# Patient Record
Sex: FEMALE | Race: WHITE
[De-identification: names, ages, dates, MRNs, and addresses within clinical notes are randomized per-mention and may not be internally consistent; named-entity substitution may affect disease eponyms.]

---

## 2017-07-24 ENCOUNTER — HOSPITAL ENCOUNTER (EMERGENCY)
Dept: HOSPITAL 10 - E/R | Age: 62
Discharge: HOME | End: 2017-07-24
Payer: MEDICARE

## 2017-07-24 VITALS
HEIGHT: 68 IN | HEIGHT: 68 IN | WEIGHT: 182.98 LBS | BODY MASS INDEX: 27.73 KG/M2 | WEIGHT: 182.98 LBS | BODY MASS INDEX: 27.73 KG/M2

## 2017-07-24 VITALS
DIASTOLIC BLOOD PRESSURE: 65 MMHG | HEART RATE: 77 BPM | SYSTOLIC BLOOD PRESSURE: 134 MMHG | TEMPERATURE: 97.9 F | RESPIRATION RATE: 18 BRPM

## 2017-07-24 DIAGNOSIS — R40.2362: ICD-10-CM

## 2017-07-24 DIAGNOSIS — R40.2142: ICD-10-CM

## 2017-07-24 DIAGNOSIS — K76.0: Primary | ICD-10-CM

## 2017-07-24 DIAGNOSIS — R40.2252: ICD-10-CM

## 2017-07-24 LAB
ADD UMIC: NO
ALBUMIN SERPL-MCNC: 4.5 G/DL (ref 3.3–4.9)
ALBUMIN/GLOB SERPL: 1.5 {RATIO}
ALP SERPL-CCNC: 44 IU/L (ref 42–121)
ALT SERPL-CCNC: 28 IU/L (ref 13–69)
ANION GAP SERPL CALC-SCNC: 18 MMOL/L (ref 8–16)
AST SERPL-CCNC: 23 IU/L (ref 15–46)
BASOPHILS # BLD AUTO: 0 10^3/UL (ref 0–0.1)
BASOPHILS NFR BLD: 0.6 % (ref 0–2)
BILIRUB DIRECT SERPL-MCNC: 0 MG/DL (ref 0–0.2)
BILIRUB SERPL-MCNC: 0.7 MG/DL (ref 0.2–1.3)
BUN SERPL-MCNC: 17 MG/DL (ref 7–20)
CALCIUM SERPL-MCNC: 9.4 MG/DL (ref 8.4–10.2)
CHLORIDE SERPL-SCNC: 102 MMOL/L (ref 97–110)
CO2 SERPL-SCNC: 27 MMOL/L (ref 21–31)
COLOR UR: YELLOW
CREAT SERPL-MCNC: 0.72 MG/DL (ref 0.44–1)
EOSINOPHIL # BLD: 0.1 10^3/UL (ref 0–0.5)
EOSINOPHIL NFR BLD: 0.7 % (ref 0–7)
ERYTHROCYTE [DISTWIDTH] IN BLOOD BY AUTOMATED COUNT: 13.2 % (ref 11.5–14.5)
GLOBULIN SER-MCNC: 3 G/DL (ref 1.3–3.2)
GLUCOSE SERPL-MCNC: 91 MG/DL (ref 70–220)
GLUCOSE UR STRIP-MCNC: NEGATIVE MG/DL
HCT VFR BLD CALC: 41.2 % (ref 37–47)
HGB BLD-MCNC: 13.7 G/DL (ref 12–16)
KETONES UR STRIP.AUTO-MCNC: (no result) MG/DL
LYMPHOCYTES # BLD AUTO: 3.1 10^3/UL (ref 0.8–2.9)
LYMPHOCYTES NFR BLD AUTO: 44.8 % (ref 15–51)
MCH RBC QN AUTO: 29.5 PG (ref 29–33)
MCHC RBC AUTO-ENTMCNC: 33.3 G/DL (ref 32–37)
MCV RBC AUTO: 88.8 FL (ref 82–101)
MONOCYTES # BLD: 0.6 10^3/UL (ref 0.3–0.9)
MONOCYTES NFR BLD: 8.3 % (ref 0–11)
NEUTROPHILS # BLD: 3.1 10^3/UL (ref 1.6–7.5)
NEUTROPHILS NFR BLD AUTO: 45.5 % (ref 39–77)
NITRITE UR QL STRIP.AUTO: NEGATIVE MG/DL
NRBC # BLD MANUAL: 0 10^3/UL (ref 0–0)
NRBC BLD AUTO-RTO: 0 /100WBC (ref 0–0)
PLATELET # BLD: 167 10^3/UL (ref 140–415)
PMV BLD AUTO: 12.3 FL (ref 7.4–10.4)
POTASSIUM SERPL-SCNC: 3.9 MMOL/L (ref 3.5–5.1)
PROT SERPL-MCNC: 7.5 G/DL (ref 6.1–8.1)
RBC # BLD AUTO: 4.64 10^6/UL (ref 4.2–5.4)
RBC # UR AUTO: NEGATIVE MG/DL
SODIUM SERPL-SCNC: 143 MMOL/L (ref 135–144)
UR ASCORBIC ACID: NEGATIVE MG/DL
UR BILIRUBIN (DIP): NEGATIVE MG/DL
UR CLARITY: CLEAR
UR PH (DIP): 6 (ref 5–9)
UR SPECIFIC GRAVITY (DIP): 1.02 (ref 1–1.03)
UR TOTAL PROTEIN (DIP): NEGATIVE MG/DL
UROBILINOGEN UR STRIP-ACNC: NEGATIVE MG/DL
WBC # BLD AUTO: 6.9 10^3/UL (ref 4.8–10.8)
WBC # UR STRIP: NEGATIVE LEU/UL

## 2017-07-24 PROCEDURE — 80053 COMPREHEN METABOLIC PANEL: CPT

## 2017-07-24 PROCEDURE — 85025 COMPLETE CBC W/AUTO DIFF WBC: CPT

## 2017-07-24 PROCEDURE — 81003 URINALYSIS AUTO W/O SCOPE: CPT

## 2017-07-24 PROCEDURE — 76705 ECHO EXAM OF ABDOMEN: CPT

## 2017-07-24 PROCEDURE — 99285 EMERGENCY DEPT VISIT HI MDM: CPT

## 2017-07-24 PROCEDURE — 36415 COLL VENOUS BLD VENIPUNCTURE: CPT

## 2017-07-24 PROCEDURE — 83690 ASSAY OF LIPASE: CPT

## 2017-07-24 PROCEDURE — 74176 CT ABD & PELVIS W/O CONTRAST: CPT

## 2017-07-24 NOTE — RADRPT
PROCEDURE:   CT Abdomen and Pelvis without contrast. 

 

CLINICAL INDICATION:  Right upper quadrant pain.

 

TECHNIQUE:   CT scan of the abdomen and pelvis without contrast was performed on a multidetector hig
h-resolution CT scanner. The patient was scanned without intravenous contrast.  Coronal and sagittal
 reformatted images were obtained from the axial source images. Images were reviewed on a high-resol
[employer] PACS workstation. The total exam CTDI equals 16.59 mGy and the total exam DLP equals 974.38 mG
y-cm.

 

One or more of the following dose reduction techniques were used:

Automated exposure control.

Adjustment of the mA and/or kV according to patient size.

Use of iterative reconstruction technique.

 

COMPARISON:   None 

 

FINDINGS:

 

CT abdomen:

 

The lung bases are clear.  The heart size is normal, without pericardial thickening or effusion.  Th
e liver is normal in size and density without focal mass or intrahepatic biliary dilatation.  The sp
marques is normal in size and homogeneous in density.  The stomach is partially collapsed, but is gross
ly unremarkable.  The pancreas as visualized is normal.  The gallbladder is surgically absent. There
 is prominence of the common bile duct measures up to 10 mm with normal distal tapering. The adrenal
 glands are symmetric and normal.  The kidneys are symmetrically unremarkable as well.  No renal donnie
culus or obstructive uropathy or mass lesion is seen. There is approximately 2.3 cm exophytic cortic
al cyst in the mid pole right kidney.

 

The aorta is of normal caliber.  Aortic vascular calcifications are present.  There is no retroperit
villatoro lymphadenopathy.  The ezekiel hepatis region is clear.  The bowel and mesentery, as visualized, 
are equally unremarkable. There is a small hiatal hernia.

 

CT pelvis:

 

The small bowel loops situated within the pelvis are unremarkable. The appendix is normal. There is 
a retroverted and lobulated enlarged uterus with scattered foci of calcifications.  The pelvic sidew
alls and inguinal regions are clear.  The sigmoid colon and rectum are unremarkable.  No mass, lymph
adenopathy, or free fluid is seen.  No acute inflammation is seen.  There is mild retrolisthesis of 
L1 on L2. There is severe right neural foraminal stenosis primarily related to asymmetric facet arth
ropathy. No osteolytic or osteoblastic lesion is detected.  

 

IMPRESSION:

 

1.  No mass, lymphadenopathy, or focal acute inflammatory process is identified.

2.  Status post cholecystectomy.  Mild prominence of the common bile duct with normal distal taperin
g in keeping with post cholecystectomy changes.

3.  Small hiatal hernia.

4.  Normal appendix.

5.  Myomatous uterus.

 

 

RPTAT: BB

_____________________________________________ 

.Colin Faria MD, MD           Date    Time 

Electronically viewed and signed by .Colin Faria MD, MD on 07/24/2017 16:46 

 

D:  07/24/2017 16:46  T:  07/24/2017 16:46

.O/

## 2017-07-24 NOTE — RADRPT
PROCEDURE:   Right Upper Quadrant Ultrasound. 

 

CLINICAL INDICATION:   Abdominal Pain, history of cholecystectomy

 

TECHNIQUE:   Multiple real-time images were acquired of the patient's right upper quadrant abdomen a
nd retroperitoneum utilizing a high resolution transducer. 

 

COMPARISON:   None 

 

FINDINGS:

 

The liver measures 14.3 cm, and demonstrates mildly increased echogenicity. The main portal vein is 
patent with proper directional flow. There is no intrahepatic biliary ductal dilatation. The extrahe
patic common bile duct measures 8 mm.

 

The gallbladder is absent.

 

The visualized pancreas is unremarkable.

 

The right kidney measures 11.3 cm and demonstrates normal echotexture. There is no right renal calcu
davy or hydronephrosis.

There is a 2.2 cm simple cyst in the midpole of the right kidney.

 

The visualized abdominal aorta and IVC are grossly unremarkable.

 

 

IMPRESSION:

Mild fatty infiltration of the liver.

 

Status post cholecystectomy with mild dilatation of the common bile duct to 8 mm consistent with pos
t cholecystectomy change. 

 

 

RPTAT: EE

_____________________________________________ 

Physician Reena           Date    Time 

Electronically viewed and signed by Physician Reena on 07/24/2017 14:19 

 

D:  07/24/2017 14:19  T:  07/24/2017 14:19

CYNDY/

## 2018-04-07 ENCOUNTER — HOSPITAL ENCOUNTER (EMERGENCY)
Age: 63
Discharge: HOME | End: 2018-04-07

## 2018-04-07 ENCOUNTER — HOSPITAL ENCOUNTER (EMERGENCY)
Dept: HOSPITAL 91 - E/R | Age: 63
Discharge: HOME | End: 2018-04-07
Payer: MEDICARE

## 2018-04-07 DIAGNOSIS — M25.551: Primary | ICD-10-CM

## 2018-04-07 PROCEDURE — 73510: CPT

## 2018-04-07 PROCEDURE — 99284 EMERGENCY DEPT VISIT MOD MDM: CPT

## 2018-04-07 PROCEDURE — 96374 THER/PROPH/DIAG INJ IV PUSH: CPT

## 2018-04-07 RX ADMIN — HYDROCODONE BITARTRATE AND ACETAMINOPHEN 1 TAB: 10; 325 TABLET ORAL at 15:20

## 2018-04-07 RX ADMIN — METHYLPREDNISOLONE SODIUM SUCCINATE 1 MG: 125 INJECTION, POWDER, FOR SOLUTION INTRAMUSCULAR; INTRAVENOUS at 16:44

## 2018-04-07 RX ADMIN — DIAZEPAM 1 MG: 5 TABLET ORAL at 15:20
